# Patient Record
Sex: FEMALE | Race: WHITE | HISPANIC OR LATINO
[De-identification: names, ages, dates, MRNs, and addresses within clinical notes are randomized per-mention and may not be internally consistent; named-entity substitution may affect disease eponyms.]

---

## 2018-09-04 PROBLEM — Z00.00 ENCOUNTER FOR PREVENTIVE HEALTH EXAMINATION: Status: ACTIVE | Noted: 2018-09-04

## 2018-09-12 PROBLEM — I60.9 SUBARACHNOID HEMORRHAGE, NONTRAUMATIC: Status: RESOLVED | Noted: 2018-09-12 | Resolved: 2018-09-12

## 2018-09-12 PROBLEM — Z87.828 HISTORY OF MOTOR VEHICLE ACCIDENT: Status: RESOLVED | Noted: 2018-09-12 | Resolved: 2018-09-12

## 2018-09-12 PROBLEM — Z78.9 SOCIAL ALCOHOL USE: Status: ACTIVE | Noted: 2018-09-12

## 2018-09-12 PROBLEM — M81.0 OSTEOPOROSIS: Status: ACTIVE | Noted: 2018-09-12

## 2018-09-12 PROBLEM — Z87.891 FORMER SMOKER: Status: ACTIVE | Noted: 2018-09-12

## 2018-09-12 PROBLEM — M19.90 ARTHRITIS: Status: ACTIVE | Noted: 2018-09-12

## 2018-09-12 PROBLEM — Z56.0 UNEMPLOYED: Status: ACTIVE | Noted: 2018-09-12

## 2018-09-12 PROBLEM — Z86.69 HISTORY OF HYDROCEPHALUS: Status: RESOLVED | Noted: 2018-09-12 | Resolved: 2018-09-12

## 2018-09-12 PROBLEM — I10 HTN (HYPERTENSION): Status: ACTIVE | Noted: 2018-09-12

## 2018-09-13 ENCOUNTER — APPOINTMENT (OUTPATIENT)
Dept: NEUROSURGERY | Facility: CLINIC | Age: 58
End: 2018-09-13
Payer: MEDICAID

## 2018-09-13 VITALS
HEIGHT: 62 IN | SYSTOLIC BLOOD PRESSURE: 148 MMHG | HEART RATE: 70 BPM | OXYGEN SATURATION: 99 % | DIASTOLIC BLOOD PRESSURE: 87 MMHG | RESPIRATION RATE: 15 BRPM | TEMPERATURE: 98.2 F | WEIGHT: 123 LBS | BODY MASS INDEX: 22.63 KG/M2

## 2018-09-13 DIAGNOSIS — Z86.69 PERSONAL HISTORY OF OTHER DISEASES OF THE NERVOUS SYSTEM AND SENSE ORGANS: ICD-10-CM

## 2018-09-13 DIAGNOSIS — Z78.9 OTHER SPECIFIED HEALTH STATUS: ICD-10-CM

## 2018-09-13 DIAGNOSIS — Z98.2 PRESENCE OF CEREBROSPINAL FLUID DRAINAGE DEVICE: ICD-10-CM

## 2018-09-13 DIAGNOSIS — M19.90 UNSPECIFIED OSTEOARTHRITIS, UNSPECIFIED SITE: ICD-10-CM

## 2018-09-13 DIAGNOSIS — Z87.891 PERSONAL HISTORY OF NICOTINE DEPENDENCE: ICD-10-CM

## 2018-09-13 DIAGNOSIS — Z87.828 PERSONAL HISTORY OF OTHER (HEALED) PHYSICAL INJURY AND TRAUMA: ICD-10-CM

## 2018-09-13 DIAGNOSIS — M81.0 AGE-RELATED OSTEOPOROSIS W/OUT CURRENT PATHOLOGICAL FRACTURE: ICD-10-CM

## 2018-09-13 DIAGNOSIS — I10 ESSENTIAL (PRIMARY) HYPERTENSION: ICD-10-CM

## 2018-09-13 DIAGNOSIS — G91.9 HYDROCEPHALUS, UNSPECIFIED: ICD-10-CM

## 2018-09-13 DIAGNOSIS — Z56.0 UNEMPLOYMENT, UNSPECIFIED: ICD-10-CM

## 2018-09-13 DIAGNOSIS — I60.9 NONTRAUMATIC SUBARACHNOID HEMORRHAGE, UNSPECIFIED: ICD-10-CM

## 2018-09-13 PROCEDURE — 99205 OFFICE O/P NEW HI 60 MIN: CPT

## 2018-09-13 SDOH — ECONOMIC STABILITY - INCOME SECURITY: UNEMPLOYMENT, UNSPECIFIED: Z56.0

## 2018-09-28 ENCOUNTER — LABORATORY RESULT (OUTPATIENT)
Age: 58
End: 2018-09-28

## 2018-09-28 LAB — PLATELET RESPONSE ASPIRIN: 390 ARU

## 2018-10-01 ENCOUNTER — INPATIENT (INPATIENT)
Facility: HOSPITAL | Age: 58
LOS: 0 days | Discharge: HOME | DRG: 27 | End: 2018-10-02
Attending: RADIOLOGY | Admitting: RADIOLOGY
Payer: MEDICAID

## 2018-10-01 VITALS
TEMPERATURE: 98 F | HEART RATE: 92 BPM | DIASTOLIC BLOOD PRESSURE: 70 MMHG | SYSTOLIC BLOOD PRESSURE: 119 MMHG | OXYGEN SATURATION: 100 % | RESPIRATION RATE: 16 BRPM

## 2018-10-01 DIAGNOSIS — I67.1 CEREBRAL ANEURYSM, NONRUPTURED: ICD-10-CM

## 2018-10-01 DIAGNOSIS — Z98.2 PRESENCE OF CEREBROSPINAL FLUID DRAINAGE DEVICE: Chronic | ICD-10-CM

## 2018-10-01 DIAGNOSIS — M81.0 AGE-RELATED OSTEOPOROSIS WITHOUT CURRENT PATHOLOGICAL FRACTURE: ICD-10-CM

## 2018-10-01 DIAGNOSIS — K59.00 CONSTIPATION, UNSPECIFIED: ICD-10-CM

## 2018-10-01 DIAGNOSIS — Z98.890 OTHER SPECIFIED POSTPROCEDURAL STATES: Chronic | ICD-10-CM

## 2018-10-01 DIAGNOSIS — I10 ESSENTIAL (PRIMARY) HYPERTENSION: ICD-10-CM

## 2018-10-01 LAB
HCG SERPL-ACNC: 2 MIU/ML — SIGNIFICANT CHANGE UP
PA ADP PRP-ACNC: 0 PRU — LOW (ref 194–418)
PA ADP PRP-ACNC: 2 PRU — LOW (ref 194–418)
PLATELET RESPONSE ASPIRIN RESULT: 387 ARU — SIGNIFICANT CHANGE UP (ref 350–700)

## 2018-10-01 PROCEDURE — 36224 PLACE CATH CAROTD ART: CPT | Mod: 50

## 2018-10-01 PROCEDURE — 76377 3D RENDER W/INTRP POSTPROCES: CPT | Mod: 26

## 2018-10-01 PROCEDURE — 61624 TCAT PERM OCCLS/EMBOLJ CNS: CPT

## 2018-10-01 PROCEDURE — 75898 FOLLOW-UP ANGIOGRAPHY: CPT | Mod: 26

## 2018-10-01 PROCEDURE — 99291 CRITICAL CARE FIRST HOUR: CPT | Mod: 24

## 2018-10-01 PROCEDURE — 75894 X-RAYS TRANSCATH THERAPY: CPT | Mod: 26

## 2018-10-01 RX ORDER — ERGOCALCIFEROL 1.25 MG/1
1 CAPSULE ORAL
Qty: 0 | Refills: 0 | COMMUNITY

## 2018-10-01 RX ORDER — SODIUM CHLORIDE 9 MG/ML
1000 INJECTION INTRAMUSCULAR; INTRAVENOUS; SUBCUTANEOUS
Qty: 0 | Refills: 0 | Status: DISCONTINUED | OUTPATIENT
Start: 2018-10-01 | End: 2018-10-02

## 2018-10-01 RX ORDER — BENZOCAINE AND MENTHOL 5; 1 G/100ML; G/100ML
1 LIQUID ORAL EVERY 6 HOURS
Qty: 0 | Refills: 0 | Status: DISCONTINUED | OUTPATIENT
Start: 2018-10-01 | End: 2018-10-02

## 2018-10-01 RX ORDER — ATENOLOL 25 MG/1
1 TABLET ORAL
Qty: 0 | Refills: 0 | COMMUNITY

## 2018-10-01 RX ORDER — CLOPIDOGREL BISULFATE 75 MG/1
1 TABLET, FILM COATED ORAL
Qty: 0 | Refills: 0 | COMMUNITY

## 2018-10-01 RX ORDER — SENNA PLUS 8.6 MG/1
2 TABLET ORAL AT BEDTIME
Qty: 0 | Refills: 0 | Status: DISCONTINUED | OUTPATIENT
Start: 2018-10-01 | End: 2018-10-02

## 2018-10-01 RX ORDER — CLOPIDOGREL BISULFATE 75 MG/1
75 TABLET, FILM COATED ORAL DAILY
Qty: 30 | Refills: 2 | Status: DISCONTINUED | COMMUNITY
Start: 2018-09-17 | End: 2018-10-01

## 2018-10-01 RX ORDER — ACETAMINOPHEN 500 MG
650 TABLET ORAL EVERY 6 HOURS
Qty: 0 | Refills: 0 | Status: DISCONTINUED | OUTPATIENT
Start: 2018-10-01 | End: 2018-10-02

## 2018-10-01 RX ORDER — ONDANSETRON 8 MG/1
4 TABLET, FILM COATED ORAL EVERY 6 HOURS
Qty: 0 | Refills: 0 | Status: DISCONTINUED | OUTPATIENT
Start: 2018-10-01 | End: 2018-10-02

## 2018-10-01 RX ORDER — DOCUSATE SODIUM 100 MG
100 CAPSULE ORAL
Qty: 0 | Refills: 0 | Status: DISCONTINUED | OUTPATIENT
Start: 2018-10-01 | End: 2018-10-02

## 2018-10-01 RX ORDER — CHLORHEXIDINE GLUCONATE 213 G/1000ML
1 SOLUTION TOPICAL ONCE
Qty: 0 | Refills: 0 | Status: DISCONTINUED | OUTPATIENT
Start: 2018-10-01 | End: 2018-10-02

## 2018-10-01 RX ORDER — DOCUSATE SODIUM 100 MG
1 CAPSULE ORAL
Qty: 0 | Refills: 0 | COMMUNITY

## 2018-10-01 RX ORDER — ATENOLOL 25 MG/1
100 TABLET ORAL DAILY
Qty: 0 | Refills: 0 | Status: DISCONTINUED | OUTPATIENT
Start: 2018-10-01 | End: 2018-10-02

## 2018-10-01 RX ORDER — ASPIRIN/CALCIUM CARB/MAGNESIUM 324 MG
81 TABLET ORAL DAILY
Qty: 0 | Refills: 0 | Status: DISCONTINUED | OUTPATIENT
Start: 2018-10-01 | End: 2018-10-02

## 2018-10-01 RX ADMIN — Medication 650 MILLIGRAM(S): at 20:30

## 2018-10-01 RX ADMIN — Medication 650 MILLIGRAM(S): at 20:02

## 2018-10-01 RX ADMIN — SODIUM CHLORIDE 100 MILLILITER(S): 9 INJECTION INTRAMUSCULAR; INTRAVENOUS; SUBCUTANEOUS at 14:22

## 2018-10-01 RX ADMIN — Medication 100 MILLIGRAM(S): at 20:02

## 2018-10-01 NOTE — PROGRESS NOTE ADULT - ASSESSMENT
58y/F with  1.  multiple intracranial aneurysms, small unruptured anterior communicating artery aneurysm s/p angio / embo (10/01/2018, Dr. Burton)  2.  Hypertension  3.  Constipation   4.  Osteoporosis    PLAN:   NEURO: neurochecks q1h PRN pain meds with Tylenol  s/p coiling of aneruysm, continue ASA 81mg daily, off clopidogrel  REHAB:  physical therapy evaluation and management    EARLY MOB:  flat x 4 hours then HOB up    PULM:  PRN O2 support to keep sats >/=92%, incentive spirometry  CARDIO:  SBP goal 100-150mm Hg, continue atenolol  ENDO:  Blood sugar goals 140-180 mg/dL, continue insulin sliding scale  GI:  bowel regimen  DIET: advance as tolerated  RENAL:  IVL once eating adequately  HEM/ONC: check post-op Hb  VTE Prophylaxis: SCDs only, no DVT chemoprophylaxis for now as patient is high risk for bleed (fresh post-op)  ID: afebrile, no leukocytosis  Social: will update family  MISC:  lip ecchymosis: with P2Y12 0 - needs careful monitoring, yaneth area to check for expansion, ice packs for now, plt transfusion if expands.    ATTENDING ATTESTATION:  I was physically present for the key portions of the evaluation and management (E/M) service provided.  I agree with the above history, physical and plan, which I have reviewed and edited where appropriate.    Patient at high risk for neurological deterioration or death due to:  ICU delirium, aspiration PNA, DVT / PE.  Critical care time, excluding procedures: 60 minutes spent on total encounter, more than 50% of the visit was spent counseling and/or coordinating care by the attending physician.     Plan discussed with RN, house staff.

## 2018-10-01 NOTE — PROGRESS NOTE ADULT - SUBJECTIVE AND OBJECTIVE BOX
Post op Note    Dx: Cerebral aneurysm     58y    Female   s/p R femoral cerebral aneurysm and stent assisted coiling of aneurysm. POD #0           T(C): 36.5 (10-01-18 @ 13:56), Max: 36.5 (10-01-18 @ 13:56)  HR: 84 (10-01-18 @ 16:27) (80 - 92)  BP: 148/82 (10-01-18 @ 16:27) (119/70 - 149/81)  RR: 17 (10-01-18 @ 16:27) (12 - 17)  SpO2: 100% (10-01-18 @ 16:27) (100% - 100%)  Wt(kg): --      Physical exam  Awake, alert, oriented x 3, PERRL, EOMI  Follows commands, speech clear  GONZALEZ X4 with good strength   Incision: C/D/I Post op Note    Dx: Cerebral aneurysm     58y    Female  with prior hx of aneurysm clipping now s/p R femoral cerebral aneurysm and stent assisted coiling of aneurysm. POD #0     T(C): 36.5 (10-01-18 @ 13:56), Max: 36.5 (10-01-18 @ 13:56)  HR: 84 (10-01-18 @ 16:27) (80 - 92)  BP: 148/82 (10-01-18 @ 16:27) (119/70 - 149/81)  RR: 17 (10-01-18 @ 16:27) (12 - 17)  SpO2: 100% (10-01-18 @ 16:27) (100% - 100%)  Wt(kg): --      Physical exam  Awake, alert, oriented x 3, PERRL, EOMI  Follows commands, speech clear  GONZALEZ X4 with good strength   Incision: C/D/I; groin dressing in place. distal pulsation intact.       Plan:   - neurocheck   - groin check, remove groin dressing in AM   - Vitals/pain control   - keep flat x 4 hours, then HOB up to 30  - Cont. ASA  - Post op labs stable, am labs   - ADAT once HOB up   - dc IVF once PO intake  - remove cordon, a-line once oob   - cont. home meds, do not start Plavix  - normotensive SBP goals, cont. atenolol, home med   - Incentive spirometry  - PT/OT: left LE weakness, chronic, ambulates with cane at baseline  - d/w Dr wei and Dr. Burton

## 2018-10-01 NOTE — BRIEF OPERATIVE NOTE - PRE-OP DX
Cerebral aneurysm without rupture  10/01/2018  Anterior communicating artery aneurysm  Active  Shaun Villalba

## 2018-10-01 NOTE — BRIEF OPERATIVE NOTE - OPERATION/FINDINGS
Under General Anesthesia, using a 6 fr sheath right CFA, cerebral angiogram was performed.  Findings: 4 mmx 4.5 mm Anterior communicating artery aneurysm, small Acomm, symmetric A1's.  Under IV systemic heparinization, endovascular coil embolization of acomm aneurysm successfully performed.   Full report to follow.  Patient tolerated procedure well, was awaken and extubated in angio suite, no new neurological deficit, hemodynamically stable.  Right groin/vasc access site: Perclose, direct manual compression applied, hemostasis achieved, no hematoma.  Patient was transferred to PACU   Above d/w Dr. Burton

## 2018-10-01 NOTE — H&P ADULT - HISTORY OF PRESENT ILLNESS
58 year old right handed woman with h/o SAH 20 years ago, whom at the time underwent clip ligation of multiple intra cranial aneurysms, patient recovered well, however, several months ago she developed urinary incontinence and gait instability, a CT scan demonstrated  increased hydrocephalus and she underwent  shunt placement at OSH, patient had a diagnostic cerebral angiogram which also revealed an anterior communicating artery aneurysm.  Patient was started on dual antiplatelet medications and now presents for cerebral angiogram and possible stent assisted coil embolization of aneurysm.  Patient c/o subjective incisional pain from  shunt site, but denies h/a, neck pain, dizziness, N/V, double, blurry vision, she denies CP, SOB, fever, chills.

## 2018-10-01 NOTE — PROGRESS NOTE ADULT - SUBJECTIVE AND OBJECTIVE BOX
=================================  NEUROCRITICAL CARE ATTENDING NOTE  =================================    ELKE ESPINOZA   MRN-[id number]  Summary:  58y/F  with h/o SAH 20y ago s/p clipping; now presents with urinary incontinence, gait instability x few months.  CT showed HCP, underwent VPS.  Diagnostic cerebral angio revealed Acomm aneurysm, started on dual antiplatelet therapy and admitted 10/01 for elective angio / stent-assisted coil embolization.    Overnight Events: s/p angio, in PACU    Past Medical History: Hypertension Constipation Osteoporosis Brain aneurysm  Allergies:  penicillin (Unknown)  Home meds:  ASA 81mg daily atenolol 100mg daily colace 100mg BID vitamin D2    PHYSICAL EXAMINATION  T(C): 36.3 (10-01 @ 17:57), Max: 36.5 (10-01 @ 13:56) HR: 86 (10-01 @ 17:57) (80 - 92) BP: 146/77 (10-01 @ 17:57) (119/70 - 149/81) RR: 16 (10-01 @ 17:57) (12 - 20) SpO2: 98% (10-01 @ 17:57) (97% - 100%)  NEUROLOGIC EXAMINATION:  Patient is awake, alert, fully oriented, pupils 2-3mm equal and briskly reactive to light, EOMs intact, moves B UE and L LE with good strength; R LE flat post angio procedure  GENERAL:  not intubated, not in cardiorespiratory distress  EENT: anicteric  CARDIOVASC:  (+) S1 S2, normal rate and regular rhythm  PULMONARY:  clear to auscultation bilaterally  ABDOMEN:  soft, nontender, with normoactive bowel sounds  EXTREMITIES:  no edema; good distal pulses, no groin hematoma R  SKIN:  no rash; (+) ecchymosis angle of mouth, R    LABS:             11.2   4.1   )-----------( 229      ( 01 Oct 2018 14:55 )             35.3     145  |  108  |  16  ----------------------------<  126<H>  3.9   |  26  |  0.71    Ca    9.0      01 Oct 2018 14:55    10-01 @ 07:01  -  10-01 @ 18:05  IN: 400 mL / OUT: 1025 mL / NET: -625 mL    Bacteriology:  CSF studies:  EEG:  Neuroimaging: no images on file  Other imaging:    MEDICATIONS: asa 81mg daily atenolol 100mg daily docusate 100mg daily senna PRN    IV FLUIDS: NS@100cc/hr  DRIPS:  DIET: NPO  Lines: Sylvia  Drains:  Lanza  Wounds:    CODE STATUS:  Full Code                       GOALS OF CARE:  aggressive                      DISPOSITION:  ICU

## 2018-10-01 NOTE — H&P ADULT - PROBLEM SELECTOR PLAN 1
Cerebral Angiogram and coil embolization of aneurysm.  stop Plavix, continue aspirin 81 mg daily  Neuro checks, puncture site, and pulses checks pulses as per post angio orders.

## 2018-10-01 NOTE — BRIEF OPERATIVE NOTE - PROCEDURE
<<-----Click on this checkbox to enter Procedure Cerebral angiography with embolization of intracranial aneurysm  10/01/2018    Active  GRESTREP

## 2018-10-01 NOTE — H&P ADULT - NSHPPHYSICALEXAM_GEN_ALL_CORE
General: WD, WN, pleasant lady in NAD  Neuro: A&O x3, speech soft, but fluent and clear. EOMI, PERRLA  CN 2-12 grossly intact.  Motor right side 5/5, LUE 5/5, LLE 4+/5, dec sensation left leg.  Heart: S1-S2, RRR  Lungs: CTA b/l  Abd; Soft, NT/ND  Skin: No obvious lesions or rashes  Pulses: b/L DP 1+

## 2018-10-02 ENCOUNTER — TRANSCRIPTION ENCOUNTER (OUTPATIENT)
Age: 58
End: 2018-10-02

## 2018-10-02 VITALS
OXYGEN SATURATION: 98 % | HEART RATE: 68 BPM | DIASTOLIC BLOOD PRESSURE: 80 MMHG | TEMPERATURE: 97 F | RESPIRATION RATE: 21 BRPM | SYSTOLIC BLOOD PRESSURE: 139 MMHG

## 2018-10-02 LAB
ALBUMIN SERPL ELPH-MCNC: 3.6 G/DL — SIGNIFICANT CHANGE UP (ref 3.3–5)
ALP SERPL-CCNC: 59 U/L — SIGNIFICANT CHANGE UP (ref 40–120)
ALT FLD-CCNC: 16 U/L — SIGNIFICANT CHANGE UP (ref 10–45)
ANION GAP SERPL CALC-SCNC: 13 MMOL/L — SIGNIFICANT CHANGE UP (ref 5–17)
AST SERPL-CCNC: 17 U/L — SIGNIFICANT CHANGE UP (ref 10–40)
BASOPHILS NFR BLD AUTO: 0.2 % — SIGNIFICANT CHANGE UP (ref 0–2)
BILIRUB SERPL-MCNC: 0.4 MG/DL — SIGNIFICANT CHANGE UP (ref 0.2–1.2)
BUN SERPL-MCNC: 11 MG/DL — SIGNIFICANT CHANGE UP (ref 7–23)
CALCIUM SERPL-MCNC: 8.8 MG/DL — SIGNIFICANT CHANGE UP (ref 8.4–10.5)
CHLORIDE SERPL-SCNC: 106 MMOL/L — SIGNIFICANT CHANGE UP (ref 96–108)
CO2 SERPL-SCNC: 22 MMOL/L — SIGNIFICANT CHANGE UP (ref 22–31)
CREAT SERPL-MCNC: 0.62 MG/DL — SIGNIFICANT CHANGE UP (ref 0.5–1.3)
GLUCOSE SERPL-MCNC: 99 MG/DL — SIGNIFICANT CHANGE UP (ref 70–99)
HCT VFR BLD CALC: 30 % — LOW (ref 34.5–45)
HGB BLD-MCNC: 9.7 G/DL — LOW (ref 11.5–15.5)
LYMPHOCYTES # BLD AUTO: 22.7 % — SIGNIFICANT CHANGE UP (ref 13–44)
MAGNESIUM SERPL-MCNC: 2.1 MG/DL — SIGNIFICANT CHANGE UP (ref 1.6–2.6)
MCHC RBC-ENTMCNC: 32.1 PG — SIGNIFICANT CHANGE UP (ref 27–34)
MCHC RBC-ENTMCNC: 32.3 G/DL — SIGNIFICANT CHANGE UP (ref 32–36)
MCV RBC AUTO: 99.3 FL — SIGNIFICANT CHANGE UP (ref 80–100)
MONOCYTES NFR BLD AUTO: 8.2 % — SIGNIFICANT CHANGE UP (ref 2–14)
NEUTROPHILS NFR BLD AUTO: 68.9 % — SIGNIFICANT CHANGE UP (ref 43–77)
PHOSPHATE SERPL-MCNC: 3.1 MG/DL — SIGNIFICANT CHANGE UP (ref 2.5–4.5)
PLATELET # BLD AUTO: 208 K/UL — SIGNIFICANT CHANGE UP (ref 150–400)
POTASSIUM SERPL-MCNC: 3.9 MMOL/L — SIGNIFICANT CHANGE UP (ref 3.5–5.3)
POTASSIUM SERPL-SCNC: 3.9 MMOL/L — SIGNIFICANT CHANGE UP (ref 3.5–5.3)
PROT SERPL-MCNC: 6.1 G/DL — SIGNIFICANT CHANGE UP (ref 6–8.3)
RBC # BLD: 3.02 M/UL — LOW (ref 3.8–5.2)
RBC # FLD: 11.6 % — SIGNIFICANT CHANGE UP (ref 10.3–16.9)
SODIUM SERPL-SCNC: 141 MMOL/L — SIGNIFICANT CHANGE UP (ref 135–145)
WBC # BLD: 5.6 K/UL — SIGNIFICANT CHANGE UP (ref 3.8–10.5)
WBC # FLD AUTO: 5.6 K/UL — SIGNIFICANT CHANGE UP (ref 3.8–10.5)

## 2018-10-02 PROCEDURE — 99231 SBSQ HOSP IP/OBS SF/LOW 25: CPT | Mod: 24

## 2018-10-02 PROCEDURE — 99231 SBSQ HOSP IP/OBS SF/LOW 25: CPT

## 2018-10-02 RX ORDER — ACETAMINOPHEN 500 MG
2 TABLET ORAL
Qty: 0 | Refills: 0 | COMMUNITY
Start: 2018-10-02

## 2018-10-02 RX ORDER — ASPIRIN/CALCIUM CARB/MAGNESIUM 324 MG
1 TABLET ORAL
Qty: 0 | Refills: 0 | COMMUNITY

## 2018-10-02 RX ADMIN — Medication 650 MILLIGRAM(S): at 15:41

## 2018-10-02 RX ADMIN — Medication 650 MILLIGRAM(S): at 05:56

## 2018-10-02 RX ADMIN — Medication 650 MILLIGRAM(S): at 16:00

## 2018-10-02 RX ADMIN — Medication 650 MILLIGRAM(S): at 05:07

## 2018-10-02 RX ADMIN — ATENOLOL 100 MILLIGRAM(S): 25 TABLET ORAL at 05:53

## 2018-10-02 RX ADMIN — Medication 100 MILLIGRAM(S): at 05:53

## 2018-10-02 NOTE — DISCHARGE NOTE ADULT - PATIENT PORTAL LINK FT
You can access the "[employer] Patient Portal, offered by [facility], by registering with the following website: [email or web address] [email or web address]

## 2018-10-02 NOTE — OCCUPATIONAL THERAPY INITIAL EVALUATION ADULT - GENERAL OBSERVATIONS, REHAB EVAL
Right hand dominant. Chart reviewed, patient cleared for OT Eval by neurosurgery LEIGHTON Forbes, CORY Albarran. Received semi-supine, NAD, +tele, +heplock, +right lip hematoma, denies pain.

## 2018-10-02 NOTE — PROGRESS NOTE ADULT - ASSESSMENT
Status post coil embolization of aneurysm of anterior communicating artery    DC antiplatelet therapy in the setting of small hematoma of the lip and hyper-responsiveness to Plavix    Stepdown    Follow up angiogram in 6 months    DC planning

## 2018-10-02 NOTE — DISCHARGE NOTE ADULT - PLAN OF CARE
[facility] -May shower at home, steri strips will come off on their own,  -Stop Aspirin and Plavix, resume other home medication,  -Call Dr. Burton office for follow-up appointment  -Return to ED or call the office for any new bleeding or bruising in the right lip. Some discoloration that tracks downwards is normal. Loss of sensation, severe pain, or expanding hematoma would be a reason to go to the ED.  -For any bleeding, redness, discharge, fever, worsening pain, numbness or coldness in the right leg, or significant pain in the right leg, return to ED or call the office.  -Go to ED for any changes to neurological function such as weakness in arms or legs, slurred speech, facial asymmetry, confusion, loss of consciousness. Continue home medication Atenolol Continue Vitamin D supplementation

## 2018-10-02 NOTE — PROGRESS NOTE ADULT - ASSESSMENT
58y/F with  1.  multiple intracranial aneurysms, small unruptured anterior communicating artery aneurysm s/p angio / embo (10/01/2018, Dr. Burton)  2.  Hypertension  3.  Constipation   4.  Osteoporosis    PLAN:   NEURO: neurochecks q1h PRN pain meds with Tylenol  s/p coiling of aneruysm, continue ASA 81mg daily, off clopidogrel  REHAB:  physical therapy evaluation and management    EARLY MOB:  flat x 4 hours then HOB up    PULM:  PRN O2 support to keep sats >/=92%, incentive spirometry  CARDIO:  SBP goal 100-150mm Hg, continue atenolol  ENDO:  Blood sugar goals 140-180 mg/dL, continue insulin sliding scale  GI:  bowel regimen  DIET: advance as tolerated  RENAL:  IVL once eating adequately  HEM/ONC: check post-op Hb  VTE Prophylaxis: SCDs only, no DVT chemoprophylaxis for now as patient is high risk for bleed (fresh post-op)  ID: afebrile, no leukocytosis  Social: will update family  MISC:  lip ecchymosis: with P2Y12 0 - needs careful monitoring, yaneth area to check for expansion, ice packs for now, plt transfusion if expands.    ATTENDING ATTESTATION:  I was physically present for the key portions of the evaluation and management (E/M) service provided.  I agree with the above history, physical and plan, which I have reviewed and edited where appropriate.    Patient at high risk for neurological deterioration or death due to:  ICU delirium, aspiration PNA, DVT / PE.  Critical care time, excluding procedures: 60 minutes spent on total encounter, more than 50% of the visit was spent counseling and/or coordinating care by the attending physician.     Plan discussed with RN, house staff. 58y/F with  1.  multiple intracranial aneurysms, small unruptured anterior communicating artery aneurysm s/p angio / embo (10/01/2018, Dr. Burton)  2.  Hypertension  3.  Constipation   4.  Osteoporosis    PLAN:   NEURO: neurochecks q4h PRN pain meds with Tylenol  s/p coiling of aneruysm, d/c ASA, re-evaluate in 3 days  REHAB:  physical therapy evaluation and management    EARLY MOB:  OOB to chair ambulate    PULM:  room air, incentive spirometry  CARDIO:  SBP goal 100-150mm Hg, continue atenolol  ENDO:  Blood sugar goals 140-180 mg/dL, continue insulin sliding scale  GI:  bowel regimen  DIET: regular diet  RENAL:  IVL  HEM/ONC: Hb drop - will trend  VTE Prophylaxis: SCDs, no DVT chemoprophylaxis for now as patient is high risk for bleed (fresh post-op)  ID: afebrile, no leukocytosis  Social: will update family  MISC:  lip ecchymosis: with P2Y12 0 - needs careful monitoring, yaneth area to check for expansion, ice packs for now, plt transfusion if expands.    ATTENDING ATTESTATION:  I was physically present for the key portions of the evaluation and management (E/M) service provided.  I agree with the above history, physical and plan, which I have reviewed and edited where appropriate.    Patient at high risk for neurological deterioration or death due to:  ICU delirium, aspiration PNA, DVT / PE.  Critical care time, excluding procedures: 60 minutes spent on total encounter, more than 50% of the visit was spent counseling and/or coordinating care by the attending physician.     Plan discussed with RN, house staff. 58y/F with  1.  multiple intracranial aneurysms, small unruptured anterior communicating artery aneurysm s/p angio / embo (10/01/2018, Dr. Burton)  2.  Hypertension  3.  Constipation   4.  Osteoporosis    PLAN:   NEURO: neurochecks q4h PRN pain meds with Tylenol  s/p coiling of aneruysm, d/c ASA, re-evaluate in 3 days  REHAB:  physical therapy evaluation and management    EARLY MOB:  OOB to chair ambulate    PULM:  room air, incentive spirometry  CARDIO:  SBP goal 100-150mm Hg, continue atenolol  ENDO:  Blood sugar goals 140-180 mg/dL, continue insulin sliding scale  GI:  bowel regimen  DIET: regular diet  RENAL:  IVL  HEM/ONC: Hb drop - will trend  VTE Prophylaxis: SCDs, no DVT chemoprophylaxis for now as patient is high risk for bleed (fresh post-op)  ID: afebrile, no leukocytosis  Social: will update family  MISC:  lip ecchymosis: improved, ice packs      ATTENDING ATTESTATION:  I was physically present for the key portions of the evaluation and management (E/M) service provided.  I agree with the above history, physical and plan which I have reviewed and edited where appropriate.     Patient not at high risk for neurologic deterioration / death.  Time spent on this noncritically ill patient: 45 minutes spent on total encounter, more than 50% of the visit was spent counseling and/or coordinating care by the attending physician.    Plan discussed with RN, house staff.    REVIEW OF SYSTEMS:  No headaches, no nausea or vomiting; 14 -point review of systems otherwise unremarkable.

## 2018-10-02 NOTE — OCCUPATIONAL THERAPY INITIAL EVALUATION ADULT - ADDITIONAL COMMENTS
Per patient she was independent with ADL PTA, recently underwent  shunt placement, since has been ambulating with straight cane. Also owns RW.

## 2018-10-02 NOTE — DISCHARGE NOTE ADULT - HOSPITAL COURSE
58 year old right handed woman with h/o SAH 20 years ago, whom at the time underwent clip ligation of multiple intra cranial aneurysms, patient recovered well, however, several months ago she developed urinary incontinence and gait instability, a CT scan demonstrated  increased hydrocephalus and she underwent  shunt placement at OSH, patient had a diagnostic cerebral angiogram which also revealed an anterior communicating artery aneurysm.  Patient was started on dual antiplatelet medications and now presents for cerebral angiogram and possible stent assisted coil embolization of aneurysm.  Patient c/o subjective incisional pain from  shunt site, but denies h/a, neck pain, dizziness, N/V, double, blurry vision, she denies CP, SOB, fever, chills.    She underwent coil embolization of ACOMM artery aneurysm with Dr. Burton under general anesthesia without complication. Post-operative course was unremarkable with mild headaches as to be expected. Right groin access site clean, dry and without ecchymosis. PT/OT evaluation completed with no further needs. Aspirin and Plavix stopped after the procedure.

## 2018-10-02 NOTE — OCCUPATIONAL THERAPY INITIAL EVALUATION ADULT - MD ORDER
Per chart, 58 year old right handed woman with h/o SAH 20 years ago, whom at the time underwent clip ligation of multiple intra cranial aneurysms, patient recovered well, however, several months ago she developed urinary incontinence and gait instability, a CT scan demonstrated  increased hydrocephalus and she underwent  shunt placement at OSH, patient had a diagnostic cerebral angiogram which also revealed an anterior communicating artery aneurysm.

## 2018-10-02 NOTE — DISCHARGE NOTE ADULT - MEDICATION SUMMARY - MEDICATIONS TO STOP TAKING
I will STOP taking the medications listed below when I get home from the hospital:    Plavix 75 mg oral tablet  -- 1 tab(s) by mouth once a day    aspirin 81 mg oral tablet  -- 1 tab(s) by mouth once a day

## 2018-10-02 NOTE — PHYSICAL THERAPY INITIAL EVALUATION ADULT - PERTINENT HX OF CURRENT PROBLEM, REHAB EVAL
58y Female w/ HTN, Osteoporosis, h/o SAH now s/p cerebral angiogram for coil embolization of Acomm artery aneurysm POD#1

## 2018-10-02 NOTE — PHYSICAL THERAPY INITIAL EVALUATION ADULT - ADDITIONAL COMMENTS
Patient lives in residence with 2 steps to enter. Patient is right handed, wears reading glasses. Patient has RW, commode, shower chair and grab bars. Patient with shunt placement ~3 weeks ago, states she has been using cane for ambulation and balance has been improving.    Vision intact with smooth pursuit, face symmetrical, hematoma on lip.

## 2018-10-02 NOTE — DISCHARGE NOTE ADULT - NS AS ACTIVITY OBS
Do not drive or operate machinery/Walking-Indoors allowed/Stairs allowed/No Heavy lifting/straining/Walking-Outdoors allowed/Showering allowed

## 2018-10-02 NOTE — DISCHARGE NOTE ADULT - MEDICATION SUMMARY - MEDICATIONS TO TAKE
I will START or STAY ON the medications listed below when I get home from the hospital:    acetaminophen 325 mg oral tablet  -- 2 tab(s) by mouth every 6 hours, As needed, Mild Pain (1 - 3)  -- Indication: For Pain    atenolol 100 mg oral tablet  -- 1 tab(s) by mouth once a day  -- Indication: For HTN    Colace 100 mg oral capsule  -- 1 cap(s) by mouth 2 times a day  -- Indication: For Constipation    Vitamin D2 50,000 intl units (1.25 mg) oral capsule  -- 1 cap(s) by mouth once a week  -- Indication: For Nutritional

## 2018-10-02 NOTE — PROGRESS NOTE ADULT - SUBJECTIVE AND OBJECTIVE BOX
HPI:  58 year old right handed woman with h/o SAH 20 years ago, whom at the time underwent clip ligation of multiple intra cranial aneurysms, patient recovered well, however, several months ago she developed urinary incontinence and gait instability, a CT scan demonstrated  increased hydrocephalus and she underwent  shunt placement at OSH, patient had a diagnostic cerebral angiogram which also revealed an anterior communicating artery aneurysm.  Patient was started on dual antiplatelet medications and now presents for cerebral angiogram and possible stent assisted coil embolization of aneurysm.  Patient c/o subjective incisional pain from  shunt site, but denies h/a, neck pain, dizziness, N/V, double, blurry vision, she denies CP, SOB, fever, chills. (01 Oct 2018 10:30)    OVERNIGHT EVENTS: Patient reports tolerating PO intake. Some mild headaches. Also with loss of voice post intubation and right lip hematoma, unchanged.    Vital Signs Last 24 Hrs  T(C): 36.6 (02 Oct 2018 06:02), Max: 36.6 (02 Oct 2018 06:02)  T(F): 97.8 (02 Oct 2018 06:02), Max: 97.8 (02 Oct 2018 06:02)  HR: 70 (02 Oct 2018 08:10) (62 - 92)  BP: 156/86 (02 Oct 2018 08:10) (119/70 - 161/83)  BP(mean): 110 (02 Oct 2018 08:10) (93 - 123)  RR: 26 (02 Oct 2018 08:10) (12 - 35)  SpO2: 98% (02 Oct 2018 08:10) (96% - 100%)    I&O's Summary    01 Oct 2018 07:01  -  02 Oct 2018 07:00  --------------------------------------------------------  IN: 1700 mL / OUT: 1525 mL / NET: 175 mL    02 Oct 2018 07:01  -  02 Oct 2018 08:52  --------------------------------------------------------  IN: 200 mL / OUT: 400 mL / NET: -200 mL        PHYSICAL EXAM:  Gen: NAD, AAOx3  HEENT: PERRL. EOMI. Right upper lip hematoma, stable.  Neck: FROM, nontender  Lungs: Clear b/l  Heart: S1, S2. NSR.  Abd: Soft, NT/ND. +BS.  Exts: Right groin C/D/I w/ steristrips. Pulses 2+ throughout  Neuro: CNs II-XII intact. 5/5 str x4 extremities. Sensation to LT intact. Hypophonic but speech clear. Following commands. Gait not assessed.    TUBES/LINES:  [] Lanza  [] Lumbar Drain  [] Wound Drains  [] Others      DIET:  [] NPO  [x] Mechanical  [] Tube feeds    LABS:                        9.7    5.6   )-----------( 208      ( 02 Oct 2018 04:05 )             30.0     10-02    141  |  106  |  11  ----------------------------<  99  3.9   |  22  |  0.62    Ca    8.8      02 Oct 2018 04:05  Phos  3.1     10-02  Mg     2.1     10-02    TPro  6.1  /  Alb  3.6  /  TBili  0.4  /  DBili  x   /  AST  17  /  ALT  16  /  AlkPhos  59  10-02      Urinalysis Basic - ( 01 Oct 2018 11:09 )    Color: Yellow / Appearance: Clear / S.015 / pH: x  Gluc: x / Ketone: NEGATIVE  / Bili: Negative / Urobili: 0.2 E.U./dL   Blood: x / Protein: NEGATIVE mg/dL / Nitrite: NEGATIVE   Leuk Esterase: NEGATIVE / RBC: x / WBC x   Sq Epi: x / Non Sq Epi: x / Bacteria: x          CAPILLARY BLOOD GLUCOSE          Drug Levels: [] N/A    CSF Analysis: [] N/A      Allergies    penicillin (Unknown)    Intolerances      MEDICATIONS:  Antibiotics:    Neuro:  acetaminophen   Tablet .. 650 milliGRAM(s) Oral every 6 hours PRN  ondansetron Injectable 4 milliGRAM(s) IV Push every 6 hours PRN    Anticoagulation:  aspirin enteric coated 81 milliGRAM(s) Oral daily    OTHER:  ATENolol  Tablet 100 milliGRAM(s) Oral daily  benzocaine 15 mG/menthol 3.6 mG Lozenge 1 Lozenge Oral every 6 hours PRN  chlorhexidine 4% Liquid 1 Application(s) Topical once  docusate sodium 100 milliGRAM(s) Oral two times a day  senna 2 Tablet(s) Oral at bedtime PRN          ASSESSMENT:  58y Female w/ HTN, Osteoporosis, h/o SAH now s/p cerebral angiogram for coil embolization of Acomm artery aneurysm POD#1      PLAN:  -Transfer to Stepdown when available,  -Stop ASA,  -Stop IVF, encourage PO intake,  -OOB as tolerated,  -DC tea, continue to monitor SBP, normotensive goals,  -Pain meds PRN,  -Possible DC home today,  -D/w Dr. Burton

## 2018-10-02 NOTE — DISCHARGE NOTE ADULT - CARE PLAN
Principal Discharge DX:	Brain aneurysm  Goal:	DC Home  Assessment and plan of treatment:	-May shower at home, steri strips will come off on their own,  -Stop Aspirin and Plavix, resume other home medication,  -Call Dr. Burton office for follow-up appointment  -Return to ED or call the office for any new bleeding or bruising in the right lip. Some discoloration that tracks downwards is normal. Loss of sensation, severe pain, or expanding hematoma would be a reason to go to the ED.  -For any bleeding, redness, discharge, fever, worsening pain, numbness or coldness in the right leg, or significant pain in the right leg, return to ED or call the office.  -Go to ED for any changes to neurological function such as weakness in arms or legs, slurred speech, facial asymmetry, confusion, loss of consciousness.  Secondary Diagnosis:	Hypertension  Assessment and plan of treatment:	Continue home medication Atenolol  Secondary Diagnosis:	Osteoporosis  Assessment and plan of treatment:	Continue Vitamin D supplementation

## 2018-10-02 NOTE — DISCHARGE NOTE ADULT - CARE PROVIDER_API CALL
Cruzito Burton), Neurology; Vascular Neurology  [address], [place] [address]  Phone: [phone]  Fax: [phone]

## 2018-10-02 NOTE — PROGRESS NOTE ADULT - SUBJECTIVE AND OBJECTIVE BOX
=================================  NEUROCRITICAL CARE ATTENDING NOTE  =================================    ELKE ESPINOZA   [id number]  Summary:  58y/F  with h/o SAH 20y ago s/p clipping; now presents with urinary incontinence, gait instability x few months.  CT showed HCP, underwent VPS.  Diagnostic cerebral angio revealed Acomm aneurysm, started on dual antiplatelet therapy and admitted 10/01 for elective angio / stent-assisted coil embolization.    Overnight Events: No significant events overnight    Past Medical History: Hypertension Constipation Osteoporosis Brain aneurysm  Allergies:  penicillin (Unknown)  Home meds:  ASA 81mg daily atenolol 100mg daily colace 100mg BID vitamin D2    PHYSICAL EXAMINATION  T(C): 36.6 (10-02 @ 06:02), Max: 36.6 (10-02 @ 06:02) HR: 62 (10-02 @ 06:02) (62 - 92) BP: 131/65 (10-02 @ 06:02) (119/70 - 161/83) RR: 14 (10-02 @ 06:02) (12 - 30) SpO2: 96% (10-02 @ 06:02) (96% - 100%)   NEUROLOGIC EXAMINATION:  Patient is awake, alert, fully oriented, pupils 2-3mm equal and briskly reactive to light, EOMs intact, moves B UE and L LE with good strength; R LE flat post angio procedure  GENERAL:  not intubated, not in cardiorespiratory distress  EENT: anicteric  CARDIOVASC:  (+) S1 S2, normal rate and regular rhythm  PULMONARY:  clear to auscultation bilaterally  ABDOMEN:  soft, nontender, with normoactive bowel sounds  EXTREMITIES:  no edema; good distal pulses, no groin hematoma R  SKIN:  no rash; (+) ecchymosis angle of mouth, R    LABS:             9.7    5.6   )-----------( 208      ( 02 Oct 2018 04:05 )             30.0     141  |  106  |  11  ----------------------------<  99  3.9   |  22  |  0.62    Ca    8.8      02 Oct 2018 04:05  Phos  3.1     10-02  Mg     2.1     10-02    TPro  6.1  /  Alb  3.6  /  TBili  0.4  /  DBili  x   /  AST  17  /  ALT  16  /  AlkPhos  59  10-02    10-01 @ 07:01  -  10-02 @ 07:00  IN: 1700 mL / OUT: 1525 mL / NET: 175 mL    Bacteriology:  CSF studies:  EEG:  Neuroimaging: no images on file  Other imaging:    MEDICATIONS: ASA 81mg daily atenolol 100mg daily menthol lozenge docusate senna PRN     IV FLUIDS: NS@100cc/hr  DRIPS:  DIET: NPO  Lines: [place]  Drains:  [place]  Wounds:    CODE STATUS:  Full Code                       GOALS OF CARE:  aggressive                      DISPOSITION:  ICU =================================  NEUROCRITICAL CARE ATTENDING NOTE  =================================    ELKE ESPINOZA   [id number]  Summary:  58y/F  with h/o SAH 20y ago s/p clipping; now presents with urinary incontinence, gait instability x few months.  CT showed HCP, underwent VPS.  Diagnostic cerebral angio revealed Acomm aneurysm, started on dual antiplatelet therapy and admitted 10/01 for elective angio / stent-assisted coil embolization.    Overnight Events: No significant events overnight; lip hematoma stable    Past Medical History: Hypertension Constipation Osteoporosis Brain aneurysm  Allergies:  penicillin (Unknown)  Home meds:  ASA 81mg daily atenolol 100mg daily colace 100mg BID vitamin D2    PHYSICAL EXAMINATION  T(C): 36.6 (10-02 @ 06:02), Max: 36.6 (10-02 @ 06:02) HR: 62 (10-02 @ 06:02) (62 - 92) BP: 131/65 (10-02 @ 06:02) (119/70 - 161/83) RR: 14 (10-02 @ 06:02) (12 - 30) SpO2: 96% (10-02 @ 06:02) (96% - 100%)   NEUROLOGIC EXAMINATION:  Patient is awake, alert, fully oriented, pupils 2-3mm equal and briskly reactive to light, EOMs intact, moves B UE and L LE with good strength; R LE flat post angio procedure  GENERAL:  not intubated, not in cardiorespiratory distress  EENT: anicteric  CARDIOVASC:  (+) S1 S2, normal rate and regular rhythm  PULMONARY:  clear to auscultation bilaterally  ABDOMEN:  soft, nontender, with normoactive bowel sounds  EXTREMITIES:  no edema; good distal pulses, no groin hematoma R  SKIN:  no rash; (+) ecchymosis angle of mouth, R    LABS:             9.7 (11.2)   5.6   )-----------( 208      ( 02 Oct 2018 04:05 )             30.0     141  |  106  |  11  ----------------------------<  99  3.9   |  22  |  0.62    Ca    8.8      02 Oct 2018 04:05  Phos  3.1     10-02  Mg     2.1     10-02    TPro  6.1  /  Alb  3.6  /  TBili  0.4  /  DBili  x   /  AST  17  /  ALT  16  /  AlkPhos  59  10-02    10-01 @ 07:01  -  10-02 @ 07:00  IN: 1700 mL / OUT: 1525 mL / NET: 175 mL    Bacteriology:  CSF studies:  EEG:  Neuroimaging: no images on file  Other imaging:    MEDICATIONS: ASA 81mg daily atenolol 100mg daily menthol lozenge docusate senna PRN     IV FLUIDS: IVL  DRIPS:  DIET: regular  Lines:   Drains:    Wounds:    CODE STATUS:  Full Code                       GOALS OF CARE:  aggressive                      DISPOSITION:  8[facility]

## 2018-10-02 NOTE — DISCHARGE NOTE ADULT - INSTRUCTIONS
Regular diet. May resume regular activity but would avoid heavy lifting, bending or strenuous exercise for the next week.

## 2018-10-03 PROBLEM — I67.1 CEREBRAL ANEURYSM, NONRUPTURED: Chronic | Status: ACTIVE | Noted: 2018-10-01

## 2018-10-03 PROBLEM — M81.0 AGE-RELATED OSTEOPOROSIS WITHOUT CURRENT PATHOLOGICAL FRACTURE: Chronic | Status: ACTIVE | Noted: 2018-10-01

## 2018-10-03 PROBLEM — I10 ESSENTIAL (PRIMARY) HYPERTENSION: Chronic | Status: ACTIVE | Noted: 2018-10-01

## 2018-10-03 PROBLEM — K59.00 CONSTIPATION, UNSPECIFIED: Chronic | Status: ACTIVE | Noted: 2018-10-01

## 2018-10-05 DIAGNOSIS — I67.1 CEREBRAL ANEURYSM, NONRUPTURED: ICD-10-CM

## 2018-10-05 DIAGNOSIS — I10 ESSENTIAL (PRIMARY) HYPERTENSION: ICD-10-CM

## 2018-10-05 DIAGNOSIS — M81.0 AGE-RELATED OSTEOPOROSIS WITHOUT CURRENT PATHOLOGICAL FRACTURE: ICD-10-CM

## 2018-10-05 DIAGNOSIS — M19.90 UNSPECIFIED OSTEOARTHRITIS, UNSPECIFIED SITE: ICD-10-CM

## 2018-10-05 DIAGNOSIS — Z79.82 LONG TERM (CURRENT) USE OF ASPIRIN: ICD-10-CM

## 2018-10-05 DIAGNOSIS — R32 UNSPECIFIED URINARY INCONTINENCE: ICD-10-CM

## 2018-10-05 DIAGNOSIS — Z88.0 ALLERGY STATUS TO PENICILLIN: ICD-10-CM

## 2018-10-09 PROCEDURE — 85027 COMPLETE CBC AUTOMATED: CPT

## 2018-10-09 PROCEDURE — C1889: CPT

## 2018-10-09 PROCEDURE — 83735 ASSAY OF MAGNESIUM: CPT

## 2018-10-09 PROCEDURE — C1887: CPT

## 2018-10-09 PROCEDURE — 85576 BLOOD PLATELET AGGREGATION: CPT

## 2018-10-09 PROCEDURE — 81003 URINALYSIS AUTO W/O SCOPE: CPT

## 2018-10-09 PROCEDURE — 36415 COLL VENOUS BLD VENIPUNCTURE: CPT

## 2018-10-09 PROCEDURE — 80048 BASIC METABOLIC PNL TOTAL CA: CPT

## 2018-10-09 PROCEDURE — C1760: CPT

## 2018-10-09 PROCEDURE — C1894: CPT

## 2018-10-09 PROCEDURE — 84702 CHORIONIC GONADOTROPIN TEST: CPT

## 2018-10-09 PROCEDURE — 85025 COMPLETE CBC W/AUTO DIFF WBC: CPT

## 2018-10-09 PROCEDURE — 84100 ASSAY OF PHOSPHORUS: CPT

## 2018-10-09 PROCEDURE — C1769: CPT

## 2018-10-09 PROCEDURE — 80053 COMPREHEN METABOLIC PANEL: CPT

## 2018-10-09 PROCEDURE — 97161 PT EVAL LOW COMPLEX 20 MIN: CPT

## 2018-10-16 ENCOUNTER — APPOINTMENT (OUTPATIENT)
Dept: NEUROSURGERY | Facility: CLINIC | Age: 58
End: 2018-10-16
Payer: MEDICAID

## 2018-10-16 VITALS
OXYGEN SATURATION: 99 % | BODY MASS INDEX: 23.56 KG/M2 | WEIGHT: 120 LBS | SYSTOLIC BLOOD PRESSURE: 147 MMHG | HEART RATE: 60 BPM | RESPIRATION RATE: 16 BRPM | HEIGHT: 60 IN | TEMPERATURE: 97.6 F | DIASTOLIC BLOOD PRESSURE: 89 MMHG

## 2018-10-16 PROCEDURE — 99215 OFFICE O/P EST HI 40 MIN: CPT

## 2018-10-24 ENCOUNTER — APPOINTMENT (OUTPATIENT)
Dept: OTOLARYNGOLOGY | Facility: CLINIC | Age: 58
End: 2018-10-24
Payer: MEDICAID

## 2018-10-24 VITALS
HEART RATE: 64 BPM | OXYGEN SATURATION: 98 % | HEIGHT: 62 IN | SYSTOLIC BLOOD PRESSURE: 145 MMHG | WEIGHT: 120 LBS | BODY MASS INDEX: 22.08 KG/M2 | DIASTOLIC BLOOD PRESSURE: 83 MMHG

## 2018-10-24 DIAGNOSIS — Z83.79 FAMILY HISTORY OF OTHER DISEASES OF THE DIGESTIVE SYSTEM: ICD-10-CM

## 2018-10-24 DIAGNOSIS — Z80.9 FAMILY HISTORY OF MALIGNANT NEOPLASM, UNSPECIFIED: ICD-10-CM

## 2018-10-24 DIAGNOSIS — Z86.79 PERSONAL HISTORY OF OTHER DISEASES OF THE CIRCULATORY SYSTEM: ICD-10-CM

## 2018-10-24 DIAGNOSIS — Z86.73 PERSONAL HISTORY OF TRANSIENT ISCHEMIC ATTACK (TIA), AND CEREBRAL INFARCTION W/OUT RESIDUAL DEFICITS: ICD-10-CM

## 2018-10-24 DIAGNOSIS — R49.0 DYSPHONIA: ICD-10-CM

## 2018-10-24 PROCEDURE — 31575 DIAGNOSTIC LARYNGOSCOPY: CPT

## 2018-10-24 PROCEDURE — 99203 OFFICE O/P NEW LOW 30 MIN: CPT | Mod: 25

## 2018-11-28 ENCOUNTER — APPOINTMENT (OUTPATIENT)
Dept: OTOLARYNGOLOGY | Facility: CLINIC | Age: 58
End: 2018-11-28

## 2019-03-28 ENCOUNTER — APPOINTMENT (OUTPATIENT)
Dept: NEUROSURGERY | Facility: CLINIC | Age: 59
End: 2019-03-28
Payer: MEDICAID

## 2019-03-28 VITALS
WEIGHT: 120 LBS | HEIGHT: 62 IN | OXYGEN SATURATION: 97 % | SYSTOLIC BLOOD PRESSURE: 100 MMHG | DIASTOLIC BLOOD PRESSURE: 67 MMHG | BODY MASS INDEX: 22.08 KG/M2 | HEART RATE: 74 BPM | RESPIRATION RATE: 14 BRPM

## 2019-03-28 DIAGNOSIS — R56.9 UNSPECIFIED CONVULSIONS: ICD-10-CM

## 2019-03-28 PROCEDURE — 99215 OFFICE O/P EST HI 40 MIN: CPT

## 2019-03-29 RX ORDER — ERGOCALCIFEROL 1.25 MG/1
CAPSULE ORAL
Refills: 0 | Status: ACTIVE | COMMUNITY

## 2019-03-29 NOTE — PHYSICAL EXAM
[General Appearance - Alert] : alert [General Appearance - In No Acute Distress] : in no acute distress [Oriented To Time, Place, And Person] : oriented to person, place, and time [Impaired Insight] : insight and judgment were intact [Person] : oriented to person [Place] : oriented to place [Time] : oriented to time [Cranial Nerves Optic (II)] : visual acuity intact bilaterally,  pupils equal round and reactive to light [Cranial Nerves Vestibulocochlear (VIII)] : hearing was intact bilaterally [Cranial Nerves Hypoglossal (XII)] : there was no tongue deviation with protrusion [PERRL With Normal Accommodation] : pupils were equal in size, round, reactive to light, with normal accommodation [] : no respiratory distress [Respiration, Rhythm And Depth] : normal respiratory rhythm and effort [Exaggerated Use Of Accessory Muscles For Inspiration] : no accessory muscle use [Apical Impulse] : the apical impulse was normal [Heart Rate And Rhythm] : heart rate was normal and rhythm regular [FreeTextEntry5] : 3-4/5 strength on the left side, 5/5 on the right side [FreeTextEntry8] : wheelchair bound [FreeTextEntry1] : unable to assess

## 2019-03-29 NOTE — REVIEW OF SYSTEMS
[As Noted in HPI] : as noted in HPI [Facial Weakness] : facial weakness [Arm Weakness] : arm weakness [Hand Weakness] :  hand weakness [Leg Weakness] : leg weakness [Poor Coordination] : poor coordination [Difficulty Walking] : difficulty walking [Negative] : Respiratory [Difficulty Writing] : no difficulty writing [FreeTextEntry7] : poor appetite
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
## 2019-03-29 NOTE — ASSESSMENT
[FreeTextEntry1] : Dr. Burton is recommending that patient continues daily aspirin but she also needs a complete work up to rule out causes of the stroke.  She will be evaluated by the vascular neurology, hematology, cardiology and epilepsy (as she is on Keppra and never had a seizure in the past) teams.  A follow up cerebral angiogram is recommended in April 2019 but we will wait for results of stroke work up to determine date for the diagnostic procedure.  In the meantime we will obtain a CTA of the head and neck.\par \par Patient's daughter was advised to call me if they need assistance in making referral appointments.\par \par

## 2019-03-29 NOTE — ADDENDUM
[FreeTextEntry1] : 2019\par \par Prashant Laswon MD\par [facility]\par Neurosurgery\par [address]\[place] [address]\par \par Patient’s Name:  Raegan Trevino\par : 1960\par \par Dear Dr. Lwason:\par \par I saw Mrs. Trevino in my office at [facility].  She was accompanied by her daughters and health aid.  As you know, she is a 58 years-old right handed woman with past medical history of hypertension and subarachnoid hemorrhage 20 years ago.  At that time she underwent craniotomy and clip ligation of 3 aneurysms.  She recovered from the subarachnoid hemorrhage but last year she started to experience gait instability and urinary incontinence.  A CT of the head revealed hydrocephalus, she underwent  shunt placement and has had significant clinical improvement.  As part of the work up after subarachnoid hemorrhage she had a CTA and cerebral angiogram.  We had reviewed the studies from 2018 that revealed either recanalization or de toi formation of an aneurysm of the anterior communicating complex that measured 4 mm in maximum dimension.  The aneurysm was coiled on 2018.  She had been premedicated with aspirin and Plavix in preparation for possible stent placement that wasn’t necessary.  Both medications were stopped after the coiling procedure.  Since then she had been doing well neurologically until she suffered a right MCA ischemic stroke in 2018.  She has residual left hemiparesis since then and requires a wheelchair.  Work up at another institution didn’t reveal an obvious source of the stroke.  I am recommending that she continues daily but she also needs a complete work up to rule out causes of the stroke.  She will be evaluated by the vascular neurology, hematology, cardiology and epilepsy (as she is on Keppra and never had a seizure in the past) teams.  A follow up cerebral angiogram is recommended in 2019 but we will wait for results of stroke work up to determine date for the diagnostic procedure.  In the meantime we will obtain a CTA of the head and neck.\par \par Thank you for allowing us to participate on Mrs. Trevino’s care.  I will keep you updated at all times.\par \par Sincerely,\par \par \par Cruzito Burton MD\par Chief\par Neuro-Endovascular Surgery and \par Interventional Neuroradiology \par [facility]\par [address]\par [address] Floor\[place] [address]\par T:  [phone]\par F:  [phone]\par

## 2019-03-29 NOTE — REASON FOR VISIT
[Follow-Up: _____] : a [unfilled] follow-up visit [Family Member] : family member [FreeTextEntry1] : s/p stroke

## 2019-04-09 ENCOUNTER — APPOINTMENT (OUTPATIENT)
Dept: HEART AND VASCULAR | Facility: CLINIC | Age: 59
End: 2019-04-09
Payer: MEDICAID

## 2019-04-09 ENCOUNTER — TRANSCRIPTION ENCOUNTER (OUTPATIENT)
Age: 59
End: 2019-04-09

## 2019-04-09 VITALS
SYSTOLIC BLOOD PRESSURE: 90 MMHG | HEART RATE: 67 BPM | DIASTOLIC BLOOD PRESSURE: 68 MMHG | OXYGEN SATURATION: 98 % | TEMPERATURE: 98.2 F

## 2019-04-09 VITALS — WEIGHT: 95 LBS | HEIGHT: 62 IN | BODY MASS INDEX: 17.48 KG/M2

## 2019-04-09 DIAGNOSIS — R00.2 PALPITATIONS: ICD-10-CM

## 2019-04-09 PROCEDURE — 93000 ELECTROCARDIOGRAM COMPLETE: CPT

## 2019-04-09 PROCEDURE — 99205 OFFICE O/P NEW HI 60 MIN: CPT | Mod: 25

## 2019-04-09 RX ORDER — ATENOLOL 100 MG/1
100 TABLET ORAL DAILY
Refills: 0 | Status: DISCONTINUED | COMMUNITY
End: 2019-04-09

## 2019-04-09 RX ORDER — AMLODIPINE BESYLATE 10 MG/1
10 TABLET ORAL
Refills: 0 | Status: COMPLETED | COMMUNITY
End: 2019-04-09

## 2019-04-09 NOTE — HISTORY OF PRESENT ILLNESS
[FreeTextEntry1] : 58 F HTN with multiple CVAs herer to establish care\par \par \par Nov 2018 cardiac angiogram per family normal - will obtain records\par \par ekg nsr 67 bpm no st chanes normal intervals\par \par recenltly dc'ed from [facility] and PCP changed mtp tartate to xl succinate and added norvasc 10mg - SBP 90-100s, pt lethargic

## 2019-04-09 NOTE — REASON FOR VISIT
[Hypertension] : hypertension [Initial Evaluation] : an initial evaluation of [Palpitations] : palpitations

## 2019-04-09 NOTE — PHYSICAL EXAM
[General Appearance - Well Developed] : well developed [Normal Appearance] : normal appearance [Well Groomed] : well groomed [General Appearance - Well Nourished] : well nourished [No Deformities] : no deformities [General Appearance - In No Acute Distress] : no acute distress [Normal Conjunctiva] : the conjunctiva exhibited no abnormalities [Eyelids - No Xanthelasma] : the eyelids demonstrated no xanthelasmas [Normal Oral Mucosa] : normal oral mucosa [No Oral Cyanosis] : no oral cyanosis [No Oral Pallor] : no oral pallor [Normal Jugular Venous A Waves Present] : normal jugular venous A waves present [Normal Jugular Venous V Waves Present] : normal jugular venous V waves present [No Jugular Venous Zurita A Waves] : no jugular venous zurita A waves [Heart Rate And Rhythm] : heart rate and rhythm were normal [Heart Sounds] : normal S1 and S2 [Murmurs] : no murmurs present [Respiration, Rhythm And Depth] : normal respiratory rhythm and effort [Exaggerated Use Of Accessory Muscles For Inspiration] : no accessory muscle use [Auscultation Breath Sounds / Voice Sounds] : lungs were clear to auscultation bilaterally [Abdomen Soft] : soft [Abdomen Tenderness] : non-tender [Abdomen Mass (___ Cm)] : no abdominal mass palpated [Abnormal Walk] : normal gait [Gait - Sufficient For Exercise Testing] : the gait was sufficient for exercise testing [Nail Clubbing] : no clubbing of the fingernails [Cyanosis, Localized] : no localized cyanosis [Petechial Hemorrhages (___cm)] : no petechial hemorrhages [Skin Color & Pigmentation] : normal skin color and pigmentation [] : no rash [No Venous Stasis] : no venous stasis [Skin Lesions] : no skin lesions [No Skin Ulcers] : no skin ulcer [No Xanthoma] : no  xanthoma was observed [Oriented To Time, Place, And Person] : oriented to person, place, and time [Affect] : the affect was normal [Mood] : the mood was normal [No Anxiety] : not feeling anxious

## 2019-04-09 NOTE — REVIEW OF SYSTEMS
[Feeling Fatigued] : feeling fatigued [Shortness Of Breath] : shortness of breath [Palpitations] : palpitations [Negative] : Heme/Lymph

## 2019-04-17 ENCOUNTER — LABORATORY RESULT (OUTPATIENT)
Age: 59
End: 2019-04-17

## 2019-04-17 ENCOUNTER — APPOINTMENT (OUTPATIENT)
Dept: HEMATOLOGY ONCOLOGY | Facility: CLINIC | Age: 59
End: 2019-04-17
Payer: MEDICAID

## 2019-04-17 VITALS
DIASTOLIC BLOOD PRESSURE: 70 MMHG | HEIGHT: 62 IN | TEMPERATURE: 98.3 F | SYSTOLIC BLOOD PRESSURE: 109 MMHG | OXYGEN SATURATION: 98 % | BODY MASS INDEX: 17.48 KG/M2 | WEIGHT: 95 LBS | HEART RATE: 71 BPM | RESPIRATION RATE: 14 BRPM

## 2019-04-17 PROCEDURE — 99204 OFFICE O/P NEW MOD 45 MIN: CPT | Mod: 25

## 2019-04-17 PROCEDURE — 36415 COLL VENOUS BLD VENIPUNCTURE: CPT

## 2019-04-17 RX ORDER — DRONABINOL 5 MG/1
CAPSULE ORAL
Refills: 0 | Status: DISCONTINUED | COMMUNITY
End: 2019-04-17

## 2019-04-18 LAB
AT III PPP CHRO-ACNC: 104 %
CONFIRM: 33.7 SEC
DRVVT IMM 1:2 NP PPP: NORMAL
DRVVT SCREEN TO CONFIRM RATIO: 0.89 RATIO
FACT VIII ACT/NOR PPP: 112 %
HCYS SERPL-MCNC: 8.4 UMOL/L
PROT C PPP CHRO-ACNC: 106 %
PROT S AG ACT/NOR PPP IA: 77 %
SCREEN DRVVT: 36.2 SEC
SILICA CLOTTING TIME INTERPRETATION: NORMAL
SILICA CLOTTING TIME S/C: 0.82 RATIO

## 2019-04-21 DIAGNOSIS — E72.12 METHYLENETETRAHYDROFOLATE REDUCTASE DEFICIENCY: ICD-10-CM

## 2019-04-23 LAB
B2 GLYCOPROT1 IGA SERPL IA-ACNC: <5 SAU
B2 GLYCOPROT1 IGG SER-ACNC: <5 SGU
B2 GLYCOPROT1 IGM SER-ACNC: 6.3 SMU
CARDIOLIPIN AB SER IA-ACNC: NEGATIVE
DNA PLOIDY SPEC FC-IMP: NORMAL
PTR INTERP: NORMAL

## 2019-04-24 NOTE — CONSULT LETTER
[Dear  ___] : Dear  [unfilled], [Please see my note below.] : Please see my note below. [Consult Letter:] : I had the pleasure of evaluating your patient, [unfilled]. [Sincerely,] : Sincerely, [Consult Closing:] : Thank you very much for allowing me to participate in the care of this patient.  If you have any questions, please do not hesitate to contact me. [DrAurora  ___] : Dr. ELDRIDGE [FreeTextEntry3] : Vanessa Abdullahi MD\par

## 2019-04-24 NOTE — ASSESSMENT
[FreeTextEntry1] : Hypercoagulable work up positive only for double heterozygous MTHFR gene mutation with normal homocysteine...which is a low intensity risk factor for stroke...\par \par Pt to remain on dual antiplt antibodies...\par \par f/u here PRN

## 2019-04-24 NOTE — HISTORY OF PRESENT ILLNESS
[de-identified] : Patient with stroke  post aneurysmal coiling... here for hypercoagulable workup and she lacks the traditional  risk factors  for stroke...Patient denies both personal or family history of clotting...

## 2019-04-26 ENCOUNTER — APPOINTMENT (OUTPATIENT)
Dept: CT IMAGING | Facility: HOSPITAL | Age: 59
End: 2019-04-26
Payer: MEDICAID

## 2019-04-26 ENCOUNTER — OUTPATIENT (OUTPATIENT)
Dept: OUTPATIENT SERVICES | Facility: HOSPITAL | Age: 59
LOS: 1 days | End: 2019-04-26
Payer: MEDICAID

## 2019-04-26 DIAGNOSIS — Z98.2 PRESENCE OF CEREBROSPINAL FLUID DRAINAGE DEVICE: Chronic | ICD-10-CM

## 2019-04-26 DIAGNOSIS — Z98.890 OTHER SPECIFIED POSTPROCEDURAL STATES: Chronic | ICD-10-CM

## 2019-04-26 PROCEDURE — 70496 CT ANGIOGRAPHY HEAD: CPT | Mod: 26

## 2019-04-26 PROCEDURE — 70498 CT ANGIOGRAPHY NECK: CPT | Mod: 26

## 2019-04-26 PROCEDURE — 70498 CT ANGIOGRAPHY NECK: CPT

## 2019-04-26 PROCEDURE — 70496 CT ANGIOGRAPHY HEAD: CPT

## 2019-05-09 ENCOUNTER — APPOINTMENT (OUTPATIENT)
Dept: NEUROLOGY | Facility: CLINIC | Age: 59
End: 2019-05-09
Payer: MEDICAID

## 2019-05-09 VITALS
OXYGEN SATURATION: 98 % | BODY MASS INDEX: 17.85 KG/M2 | SYSTOLIC BLOOD PRESSURE: 120 MMHG | HEIGHT: 62 IN | HEART RATE: 60 BPM | WEIGHT: 97 LBS | DIASTOLIC BLOOD PRESSURE: 80 MMHG | TEMPERATURE: 97.9 F

## 2019-05-09 PROCEDURE — 99204 OFFICE O/P NEW MOD 45 MIN: CPT

## 2019-05-09 RX ORDER — CLOPIDOGREL BISULFATE 75 MG/1
75 TABLET, FILM COATED ORAL DAILY
Qty: 30 | Refills: 2 | Status: ACTIVE | COMMUNITY
Start: 2019-05-09 | End: 1900-01-01

## 2019-05-14 ENCOUNTER — APPOINTMENT (OUTPATIENT)
Dept: HEART AND VASCULAR | Facility: CLINIC | Age: 59
End: 2019-05-14
Payer: MEDICAID

## 2019-05-14 PROCEDURE — 93306 TTE W/DOPPLER COMPLETE: CPT

## 2019-05-20 NOTE — DISCUSSION/SUMMARY
[FreeTextEntry1] : Right MCA infarct\par \par Left shoulder pain due to Left shoulder subluxation --> start OT, C/W PT.\par Tylenol #3 for acute pain- 10 tabs only \par \par Can discontinue Keppra- now taking 250mg BID - start 1 tab daily x 1 week then stop\par \par Recommend loop with Dr. Champagne\par \par Increase Prozc to 40mg daily \par \par Atrovastatin 80mg -  decrease to 40mg \par \par Duel AP- c/w ASA, start plavix 75mg daily \par \par When RTC 2 months - get accumetrics \par \par \par

## 2019-05-20 NOTE — PHYSICAL EXAM
[General Appearance - Alert] : alert [General Appearance - In No Acute Distress] : in no acute distress [Person] : oriented to person [Place] : oriented to place [Time] : oriented to time [Cranial Nerves Optic (II)] : visual acuity intact bilaterally,  visual fields full to confrontation, pupils equal round and reactive to light [Cranial Nerves Oculomotor (III)] : extraocular motion intact [Sclera] : the sclera and conjunctiva were normal [PERRL With Normal Accommodation] : pupils were equal in size, round, reactive to light, with normal accommodation [Extraocular Movements] : extraocular movements were intact [] : no respiratory distress [FreeTextEntry5] : VISION LOSS BILATERAL INFERIOR LEFT QUADRANT, LEFT FACIAL DROOP  [FreeTextEntry6] : LUE AND LLE WEAKNESS - EXAM LIMITED DUE TO PAIN [FreeTextEntry8] : UNSTEADY GAIT [FreeTextEntry1] : UNSTEADY GAIT, LEFT WEAKNESS

## 2019-05-23 ENCOUNTER — APPOINTMENT (OUTPATIENT)
Dept: NEUROSURGERY | Facility: CLINIC | Age: 59
End: 2019-05-23
Payer: MEDICAID

## 2019-05-23 VITALS
BODY MASS INDEX: 17.85 KG/M2 | HEIGHT: 62 IN | OXYGEN SATURATION: 96 % | SYSTOLIC BLOOD PRESSURE: 127 MMHG | HEART RATE: 57 BPM | WEIGHT: 97 LBS | DIASTOLIC BLOOD PRESSURE: 79 MMHG | RESPIRATION RATE: 14 BRPM

## 2019-05-23 DIAGNOSIS — I63.9 CEREBRAL INFARCTION, UNSPECIFIED: ICD-10-CM

## 2019-05-23 PROCEDURE — 99215 OFFICE O/P EST HI 40 MIN: CPT

## 2019-05-23 RX ORDER — SENNOSIDES 8.6 MG TABLETS 8.6 MG/1
8.6 TABLET ORAL
Refills: 0 | Status: DISCONTINUED | COMMUNITY
End: 2019-05-23

## 2019-05-23 RX ORDER — CHOLECALCIFEROL (VITAMIN D3) 125 MCG
TABLET ORAL
Refills: 0 | Status: ACTIVE | COMMUNITY

## 2019-05-23 RX ORDER — LEVETIRACETAM 250 MG/1
250 TABLET, FILM COATED ORAL
Refills: 0 | Status: DISCONTINUED | COMMUNITY
End: 2019-05-23

## 2019-05-23 RX ORDER — GLIPIZIDE 5 MG/1
5 TABLET ORAL
Refills: 0 | Status: DISCONTINUED | COMMUNITY
End: 2019-05-23

## 2019-05-23 RX ORDER — METOPROLOL SUCCINATE 25 MG/1
25 TABLET, EXTENDED RELEASE ORAL
Refills: 0 | Status: ACTIVE | COMMUNITY

## 2019-05-23 RX ORDER — DOCUSATE CALCIUM 240 MG
CAPSULE ORAL
Refills: 0 | Status: DISCONTINUED | COMMUNITY
End: 2019-05-23

## 2019-05-23 RX ORDER — GABAPENTIN 300 MG/1
300 CAPSULE ORAL
Refills: 0 | Status: ACTIVE | COMMUNITY

## 2019-05-23 RX ORDER — ACETAMINOPHEN AND CODEINE 300; 30 MG/1; MG/1
300-30 TABLET ORAL
Qty: 10 | Refills: 0 | Status: DISCONTINUED | COMMUNITY
Start: 2019-05-09 | End: 2019-05-23

## 2019-05-23 RX ORDER — DOCUSATE SODIUM 100 MG
TABLET ORAL
Refills: 0 | Status: DISCONTINUED | COMMUNITY
End: 2019-05-23
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
## 2019-05-23 NOTE — REASON FOR VISIT
[Follow-Up: _____] : a [unfilled] follow-up visit [FreeTextEntry1] : s/p CTa head and neck and complete workup for cause of stroke

## 2019-05-23 NOTE — PHYSICAL EXAM
[General Appearance - Alert] : alert [General Appearance - In No Acute Distress] : in no acute distress [Cranial Nerves Optic (II)] : visual acuity intact bilaterally,  pupils equal round and reactive to light [Cranial Nerves Oculomotor (III)] : extraocular motion intact [Cranial Nerves Trigeminal (V)] : facial sensation intact symmetrically [Cranial Nerves Facial (VII)] : face symmetrical [Cranial Nerves Vestibulocochlear (VIII)] : hearing was intact bilaterally [Cranial Nerves Glossopharyngeal (IX)] : tongue and palate midline [Cranial Nerves Accessory (XI - Cranial And Spinal)] : head turning and shoulder shrug symmetric [Cranial Nerves Hypoglossal (XII)] : there was no tongue deviation with protrusion [Sclera] : the sclera and conjunctiva were normal [PERRL With Normal Accommodation] : pupils were equal in size, round, reactive to light, with normal accommodation [Musculoskeletal - Swelling] : no joint swelling seen [Skin Color & Pigmentation] : normal skin color and pigmentation [Skin Turgor] : normal skin turgor [5] : S1 ankle flexors 5/5 [1] : C8 finger flexors 1/5 [3] : S1 ankle flexors 3/5

## 2019-05-23 NOTE — DATA REVIEWED
[de-identified] : CTA 4/26/19 without evidence of aneurysm residual or recurrence. Persistent ventriculomegaly with right parietal shunt catheter and valve in place.

## 2019-05-23 NOTE — ASSESSMENT
[FreeTextEntry1] : Her CTA 4/26 demonstrated no residual aneurysm or recurrence. In light of this it would be reasonable to wait until 1 year post treatment to follow up with an angiogram to give her some more time to continue to recover from her ischemic stroke. She does continue to have ventriculomegaly for which she should follow up with Dr. Lawson regarding her  shunt. We will also refer her to a dietician for post-stroke care.

## 2019-06-17 ENCOUNTER — APPOINTMENT (OUTPATIENT)
Dept: HEART AND VASCULAR | Facility: CLINIC | Age: 59
End: 2019-06-17

## 2019-08-08 ENCOUNTER — APPOINTMENT (OUTPATIENT)
Dept: NEUROLOGY | Facility: CLINIC | Age: 59
End: 2019-08-08
Payer: MEDICAID

## 2019-08-08 VITALS — SYSTOLIC BLOOD PRESSURE: 112 MMHG | DIASTOLIC BLOOD PRESSURE: 78 MMHG

## 2019-08-08 DIAGNOSIS — G81.10 SPASTIC HEMIPLEGIA AFFECTING UNSPECIFIED SIDE: ICD-10-CM

## 2019-08-08 DIAGNOSIS — F32.9 ANXIETY DISORDER, UNSPECIFIED: ICD-10-CM

## 2019-08-08 DIAGNOSIS — Z79.02 LONG TERM (CURRENT) USE OF ANTITHROMBOTICS/ANTIPLATELETS: ICD-10-CM

## 2019-08-08 DIAGNOSIS — F41.9 ANXIETY DISORDER, UNSPECIFIED: ICD-10-CM

## 2019-08-08 PROCEDURE — 99214 OFFICE O/P EST MOD 30 MIN: CPT

## 2019-08-08 RX ORDER — DRONABINOL 2.5 MG/1
2.5 CAPSULE ORAL
Refills: 0 | Status: DISCONTINUED | COMMUNITY
End: 2019-08-08

## 2019-08-08 RX ORDER — ESCITALOPRAM OXALATE 10 MG/1
10 TABLET ORAL DAILY
Qty: 15 | Refills: 0 | Status: ACTIVE | COMMUNITY
Start: 2019-08-08 | End: 1900-01-01

## 2019-08-08 RX ORDER — MIRTAZAPINE 15 MG/1
15 TABLET, FILM COATED ORAL
Qty: 30 | Refills: 5 | Status: ACTIVE | COMMUNITY
Start: 2019-08-08 | End: 1900-01-01

## 2019-08-08 RX ORDER — LEVETIRACETAM 500 MG/1
500 TABLET, FILM COATED ORAL
Refills: 0 | Status: DISCONTINUED | COMMUNITY
End: 2019-08-08

## 2019-08-08 RX ORDER — FLUOXETINE HYDROCHLORIDE 40 MG/1
40 CAPSULE ORAL
Qty: 30 | Refills: 2 | Status: DISCONTINUED | COMMUNITY
Start: 1900-01-01 | End: 2019-08-08

## 2019-08-08 RX ORDER — ONABOTULINUMTOXINA 200 [USP'U]/1
200 INJECTION, POWDER, LYOPHILIZED, FOR SOLUTION INTRADERMAL; INTRAMUSCULAR
Qty: 1 | Refills: 0 | Status: ACTIVE | COMMUNITY
Start: 2019-08-08

## 2019-08-08 NOTE — REVIEW OF SYSTEMS
[Arm Weakness] : arm weakness [Leg Weakness] : leg weakness [Hand Weakness] :  hand weakness [Numbness] : numbness [Poor Coordination] : poor coordination

## 2019-08-09 NOTE — ASSESSMENT
[FreeTextEntry1] : cont asa and plavix. increase lexapro to 10\par rec botox. \par consider diamox if no surgical intervention for ventriculomegaly

## 2019-08-09 NOTE — PHYSICAL EXAM
[General Appearance - Alert] : alert [General Appearance - In No Acute Distress] : in no acute distress [Person] : oriented to person [Time] : oriented to time [Place] : oriented to place [Cranial Nerves Optic (II)] : visual acuity intact bilaterally,  visual fields full to confrontation, pupils equal round and reactive to light [Cranial Nerves Oculomotor (III)] : extraocular motion intact [Motor Handedness Right-Handed] : the patient is right hand dominant [Paresis Pronator Drift Left-Sided] : a left-sided pronator drift was present [Hemiparesis Of Left Side] : hemiparesis was present on the left [Motor Strength Lower Extremities Left] : there was weakness of the left lower extremity [Motor Strength Upper Extremities Left] : there was weakness of the left upper extremity [Sclera] : the sclera and conjunctiva were normal [PERRL With Normal Accommodation] : pupils were equal in size, round, reactive to light, with normal accommodation [Extraocular Movements] : extraocular movements were intact [] : no respiratory distress [FreeTextEntry7] : decreased LT on the left [FreeTextEntry6] : LUE AND LLE WEAKNESS -has flexor movement of both biceps and wrist and finger. finger extensors 3- , triceps 3- . MAS 3 on UE, MAS 2 in LLE [FreeTextEntry5] : VISION LOSS BILATERAL INFERIOR LEFT QUADRANT, LEFT FACIAL DROOP  [FreeTextEntry8] : UNSTEADY GAIT [FreeTextEntry1] : PARTIAL VISUAL FIELD CUT

## 2019-08-09 NOTE — HISTORY OF PRESENT ILLNESS
[FreeTextEntry1] : Keppra stopped \par prozac stopped due to anxiety.\par started on mirtazapine and escitalopram \par getting starrted on orthotic for hand and foot\par tried botox before with only one dose - had pain\par however now has pain every day with arm movement due to increased spasticity

## 2019-10-02 ENCOUNTER — RX RENEWAL (OUTPATIENT)
Age: 59
End: 2019-10-02

## 2019-10-02 RX ORDER — ATORVASTATIN CALCIUM 40 MG/1
40 TABLET, FILM COATED ORAL
Qty: 90 | Refills: 1 | Status: ACTIVE | COMMUNITY
Start: 1900-01-01 | End: 1900-01-01

## 2019-10-03 ENCOUNTER — APPOINTMENT (OUTPATIENT)
Dept: NEUROSURGERY | Facility: CLINIC | Age: 59
End: 2019-10-03

## 2019-10-29 ENCOUNTER — APPOINTMENT (OUTPATIENT)
Dept: NEUROSURGERY | Facility: CLINIC | Age: 59
End: 2019-10-29
Payer: MEDICAID

## 2019-10-29 VITALS
HEIGHT: 62 IN | RESPIRATION RATE: 18 BRPM | SYSTOLIC BLOOD PRESSURE: 118 MMHG | BODY MASS INDEX: 21.16 KG/M2 | OXYGEN SATURATION: 94 % | WEIGHT: 115 LBS | DIASTOLIC BLOOD PRESSURE: 81 MMHG | HEART RATE: 82 BPM

## 2019-10-29 PROCEDURE — 99214 OFFICE O/P EST MOD 30 MIN: CPT

## 2019-10-29 RX ORDER — AMLODIPINE BESYLATE 5 MG/1
5 TABLET ORAL
Refills: 0 | Status: DISCONTINUED | COMMUNITY
End: 2019-10-29

## 2019-10-29 NOTE — PHYSICAL EXAM
[General Appearance - Alert] : alert [Oriented To Time, Place, And Person] : oriented to person, place, and time [Impaired Insight] : insight and judgment were intact [Affect] : the affect was normal [Person] : oriented to person [Place] : oriented to place [Time] : oriented to time [Cranial Nerves Optic (II)] : visual acuity intact bilaterally,  pupils equal round and reactive to light [Cranial Nerves Oculomotor (III)] : extraocular motion intact [Cranial Nerves Vestibulocochlear (VIII)] : hearing was intact bilaterally [Cranial Nerves Glossopharyngeal (IX)] : tongue and palate midline [Cranial Nerves Hypoglossal (XII)] : there was no tongue deviation with protrusion [PERRL With Normal Accommodation] : pupils were equal in size, round, reactive to light, with normal accommodation [Neck Appearance] : the appearance of the neck was normal [] : no respiratory distress [Respiration, Rhythm And Depth] : normal respiratory rhythm and effort [Exaggerated Use Of Accessory Muscles For Inspiration] : no accessory muscle use [Apical Impulse] : the apical impulse was normal [Heart Rate And Rhythm] : heart rate was normal and rhythm regular [Musculoskeletal - Swelling] : no joint swelling seen [FreeTextEntry5] : 4/5 on the LUE/LLE, 5/5 on the RLE/RUE

## 2019-10-29 NOTE — ASSESSMENT
[FreeTextEntry1] : A CTA of the head from April 26, 2019 that didn’t show recanalization of the previously coiled aneurysm.  A follow up CTA is recommended in April 2020.  She will follow up with Dr. Burton at that time.  She will continue dual antiplatelet regimen of aspirin and Plavix for secondary stroke prevention.\par \par Plans:\par 1. Follow up CTA head and neck on April 2020.\par 2. Continue ASA/Plavix.\par 3. Continue PT/OT\par 4. Follow up with stroke neurologist, Dr. Andrew Ozuna.\par 5. Follow up with cardiologist, Dr. Marshall Leung\par 6. Follow up with Hematologist, Dr. Abdullahi\par
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
## 2019-10-29 NOTE — REVIEW OF SYSTEMS
[As Noted in HPI] : as noted in HPI [Facial Weakness] : facial weakness [Arm Weakness] : arm weakness [Hand Weakness] :  hand weakness [Leg Weakness] : leg weakness [Difficulty Walking] : difficulty walking [Limb Pain] : limb pain [Negative] : Heme/Lymph [Easy Bleeding] : no tendency for easy bleeding [Easy Bruising] : no tendency for easy bruising [FreeTextEntry9] : left arm limb pain

## 2019-10-29 NOTE — REASON FOR VISIT
[Spouse] : spouse [Other: _____] : [unfilled] [FreeTextEntry1] : LAST VISIT on 5/23/19:\par Status post R MCA ischemic stroke in November 2018 with residual left hemiparesis requiring a wheelchair. \par \par CTA 4/26/19 showed no evidence of aneurysm residual or recurrence. Persistent ventriculomegaly with right parietal shunt catheter and valve in place. \par \par It was advised to follow up in the office in October 2019 to determine if she has had significant clinical recovery from the stroke in order to perform a follow up cerebral angiogram.\par She was also instructed to follow up with Dr. Lawson regarding her  shunt. It was recommended to continue aspirin and Plavix.\par \par TODAY'S VISIT:\par Patient improving. Patient is able to walk 1 block with assist but remains on the wheelchair. She is able to lift the left arm against gravity. Her last PT/OT was in August due to insurance. Next upcoming therapy in January/February.\par \par Her PO intake is improved.\par \par She remains on ASA and Plavix.\par \par She saw Dr. Lawson regarding the enlarged ventricles--stable per daughter, Alyson.\par \par

## 2019-10-29 NOTE — ADDENDUM
[FreeTextEntry1] : Dear Dr. Lawson:\par \par We saw Mrs. Trevino in our office at [facility].  As you know, she is a 59 years-old right handed woman with past medical history of hypertension and subarachnoid hemorrhage 20 years ago.  At that time she underwent craniotomy and clip ligation of 3 aneurysms.  She recovered from the subarachnoid hemorrhage but last year she started to experience gait instability and urinary incontinence.  A CT of the head revealed hydrocephalus, she underwent  shunt placement and had significant clinical improvement.  As part of the work up after subarachnoid hemorrhage she had a CTA and cerebral angiogram.  We had reviewed the studies from August 2018 that revealed either recanalization or de toi formation of an aneurysm of the anterior communicating complex that measured 4 mm in maximum dimension.  The aneurysm was coiled on October 1, 2018.  \par \par The procedure and recovery were uneventful until she suffered a right MCA ischemic stroke in November 2018.  She has residual left hemiparesis since then and requires a wheelchair.  Work up at another institution didn’t reveal an obvious source of the stroke.  \par \par A CTA of the head from April 26, 2019 that didn’t show recanalization of the previously coiled aneurysm.  A follow up CTA is recommended in April 2020.  She will follow up in my office at that time.  She will continue dual antiplatelet regimen of aspirin and Plavix for secondary stroke prevention.\par \par Thank you for allowing us to participate on Mrs. Trevino’s care.  I will keep you updated at all times.\par \par Sincerely,\par \par \par Cruzito Burton MD\par Chief\par Neuro-Endovascular Surgery and \par Interventional Neuroradiology \par [facility]\par [facility]\par

## 2019-11-13 ENCOUNTER — APPOINTMENT (OUTPATIENT)
Dept: NEUROLOGY | Facility: CLINIC | Age: 59
End: 2019-11-13

## 2020-02-20 RX ORDER — CLOPIDOGREL BISULFATE 75 MG/1
75 TABLET, FILM COATED ORAL DAILY
Qty: 30 | Refills: 5 | Status: ACTIVE | COMMUNITY
Start: 2019-08-08 | End: 1900-01-01

## 2020-09-29 ENCOUNTER — RESULT REVIEW (OUTPATIENT)
Age: 60
End: 2020-09-29

## 2020-09-29 ENCOUNTER — APPOINTMENT (OUTPATIENT)
Dept: CT IMAGING | Facility: HOSPITAL | Age: 60
End: 2020-09-29
Payer: MEDICAID

## 2020-09-29 ENCOUNTER — OUTPATIENT (OUTPATIENT)
Dept: OUTPATIENT SERVICES | Facility: HOSPITAL | Age: 60
LOS: 1 days | End: 2020-09-29
Payer: MEDICAID

## 2020-09-29 DIAGNOSIS — Z98.2 PRESENCE OF CEREBROSPINAL FLUID DRAINAGE DEVICE: Chronic | ICD-10-CM

## 2020-09-29 DIAGNOSIS — Z98.890 OTHER SPECIFIED POSTPROCEDURAL STATES: Chronic | ICD-10-CM

## 2020-09-29 PROCEDURE — 70496 CT ANGIOGRAPHY HEAD: CPT | Mod: 26

## 2020-09-29 PROCEDURE — 70498 CT ANGIOGRAPHY NECK: CPT | Mod: 26

## 2020-09-29 PROCEDURE — 70498 CT ANGIOGRAPHY NECK: CPT

## 2020-09-29 PROCEDURE — 70496 CT ANGIOGRAPHY HEAD: CPT

## 2020-10-06 ENCOUNTER — APPOINTMENT (OUTPATIENT)
Dept: NEUROSURGERY | Facility: CLINIC | Age: 60
End: 2020-10-06
Payer: MEDICAID

## 2020-10-06 DIAGNOSIS — I67.1 CEREBRAL ANEURYSM, NONRUPTURED: ICD-10-CM

## 2020-10-06 DIAGNOSIS — G93.89 OTHER SPECIFIED DISORDERS OF BRAIN: ICD-10-CM

## 2020-10-06 DIAGNOSIS — I63.511 CEREBRAL INFARCTION DUE TO UNSPECIFIED OCCLUSION OR STENOSIS OF RIGHT MIDDLE CEREBRAL ARTERY: ICD-10-CM

## 2020-10-06 PROCEDURE — 99214 OFFICE O/P EST MOD 30 MIN: CPT | Mod: 95

## 2020-10-06 NOTE — ADDENDUM
[FreeTextEntry1] : 2020\par \par Prashant Lawson MD\par [facility]\par Neurosurgery\par [address]\[place] [address]\par \par Patient’s Name:  Raegan Trevino\par : 1960\par \par Dear Dr. Lawson:\par \par We saw Mrs. Trevino in tele health consultation at [facility].  As you know, she is a 60 years-old right handed woman with past medical history of hypertension and subarachnoid hemorrhage more than 20 years ago.  At that time she underwent craniotomy and clip ligation of 3 aneurysms.  She recovered from the subarachnoid hemorrhage but last year she started to experience gait instability and urinary incontinence.  A CT of the head revealed hydrocephalus, she underwent  shunt placement and had significant clinical improvement.  As part of the work up after subarachnoid hemorrhage she had a CTA and cerebral angiogram.  We had reviewed the studies from 2018 that revealed either recanalization or de toi formation of an aneurysm of the anterior communicating complex that measured 4 mm in maximum dimension.  The aneurysm was coiled on 2018.  \par \par The procedure and recovery were uneventful until she suffered a right MCA ischemic stroke in 2018.  She has residual left hemiparesis since then and walks with a cane.  Work up at another institution didn’t reveal a source of the stroke.  \par \par A CTA and neck of the head from 2020 was compared with the one from 2019.  There is no recanalization of the previously coiled aneurysm.  There is no stenosis in the cervical carotid arteries.  A follow up CTA is recommended in 1 year.  She will follow up in our office at that time.  She will continue dual antiplatelet regimen of aspirin and Plavix for secondary stroke prevention.\par \par Thank you for allowing us to participate on Mrs. Trevino’s care.  I will keep you updated at all times.\par \par Sincerely,\par \par \par Cruzito Burton MD\par Chief\par Neuro-Endovascular Surgery and \par Interventional Neuroradiology \par [facility]\par [facility]\par [address]\par [address] Floor\[place] [address]\par T:  [phone]\par F:  [phone]\par \par cc:	Noah Rivera MD\par 	[address]\[place] [address]\par \par 	Vanessa Abdullahi MD\par 	[address]\[place] [address]\par \par 	Marshall Leung MD\par 	[address]\[place] [address]\par \par 	Andrew Ozuna MD\par 	[address]\par 	[address]th Floor\[place] 	[place] [address]\par \par 	Mrs. Raegan Trevino\par 	[address]\par 	[address]\[place] [address]\par \par \par \par

## 2020-10-06 NOTE — REASON FOR VISIT
[Follow-Up: _____] : a [unfilled] follow-up visit [FreeTextEntry1] : LAST VISIT on 10/29/2019\par Patient improving. Patient able to walk 1 block with assist but remained on the wheelchair. She was able to lift the left arm against gravity. Her last PT/OT was in August due to insurance. Next upcoming therapy in January/February 2020. Her PO intake is improved.\par \par She remains on ASA and Plavix.\par \par She saw Dr. Lawson regarding the enlarged ventricles--stable per daughter, Alyson.\par \par CTA of the head from April 26, 2019 that didn’t show recanalization of the previously coiled aneurysm. A follow up CTA was recommended in April 2020. \par \par TODAY'S VISIT:\par Daughter reports that pt remains weak on the left side, left arm worse than the left leg. She is able to walk to the bathroom with a cane with standby assist. Family also reports that she has left arm pain in which shes on Gabapentin. She complains of baseline headaches.\par \par Patient remains on ASA, Plavix and statin.\par \par

## 2020-10-06 NOTE — ASSESSMENT
[FreeTextEntry1] : A CTA and neck of the head from September 2020 was compared with the one from April 2019.  There is no recanalization of the previously coiled aneurysm.  There is no stenosis in the cervical carotid arteries.  A follow up CTA is recommended in 1 year.  She will follow up in our office at that time.  She will continue dual antiplatelet regimen of aspirin and Plavix for secondary stroke prevention.\par \par \par Plan:\par 1. Repeat CTA head and neck in 1 year.\par 2. Continue ASA, Plavix and statin.\par 2. Continue physical therapy.
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
## 2021-11-30 NOTE — PHYSICAL THERAPY INITIAL EVALUATION ADULT - ASSISTIVE DEVICE FOR TRANSFER: GAIT, REHAB EVAL
Get a wheeled walker with a seat    Go to YouTube and look up [facility] for shoulders    Wear a wrist brace at bedtime       straight cane

## 2024-10-14 NOTE — H&P ADULT - PROBLEM SELECTOR PLAN 4
POST-OP DIAGNOSIS:  Primary osteoarthritis of left knee 14-Oct-2024 13:20:47  Massimo Tyson  
Patient is on weekly Vitamin D2 supplements